# Patient Record
Sex: MALE | Race: ASIAN | NOT HISPANIC OR LATINO
[De-identification: names, ages, dates, MRNs, and addresses within clinical notes are randomized per-mention and may not be internally consistent; named-entity substitution may affect disease eponyms.]

---

## 2019-03-25 ENCOUNTER — APPOINTMENT (OUTPATIENT)
Dept: UROLOGY | Facility: CLINIC | Age: 34
End: 2019-03-25
Payer: OTHER GOVERNMENT

## 2019-03-25 VITALS
TEMPERATURE: 98.2 F | WEIGHT: 167 LBS | HEIGHT: 68 IN | OXYGEN SATURATION: 98 % | SYSTOLIC BLOOD PRESSURE: 144 MMHG | RESPIRATION RATE: 17 BRPM | HEART RATE: 87 BPM | DIASTOLIC BLOOD PRESSURE: 85 MMHG | BODY MASS INDEX: 25.31 KG/M2

## 2019-03-25 DIAGNOSIS — Z31.9 ENCOUNTER FOR PROCREATIVE MANAGEMENT, UNSPECIFIED: ICD-10-CM

## 2019-03-25 DIAGNOSIS — Z78.9 OTHER SPECIFIED HEALTH STATUS: ICD-10-CM

## 2019-03-25 DIAGNOSIS — N47.8 OTHER DISORDERS OF PREPUCE: ICD-10-CM

## 2019-03-25 PROCEDURE — 99203 OFFICE O/P NEW LOW 30 MIN: CPT

## 2019-03-25 NOTE — HISTORY OF PRESENT ILLNESS
[FreeTextEntry1] : Patient is a 32 yo M who presents for redundant prepuce, infertility.\par \par He and his wife age 30 have been trying to conceive for 5 years.  They have regular unprotected intercourse. His wife does have abnormal uterus and told she is likely difficult to have pregnancy/conception. Neither of them have children.\par \par He is also concerned about his redundant prepuce - he does not often clean under his foreskin or retract it.  He denies having any difficulties with erections or ejaculation.  No UTIs or STIs.  No LUTS.  No pain.  He is interested in circumcision because he heard that it is beneficial for his health and fertility.  He does work in [employer] and at times cannot clean himself adequately.

## 2019-03-25 NOTE — PHYSICAL EXAM
[General Appearance - Well Developed] : well developed [General Appearance - Well Nourished] : well nourished [Normal Appearance] : normal appearance [Well Groomed] : well groomed [General Appearance - In No Acute Distress] : no acute distress [Edema] : no peripheral edema [Respiration, Rhythm And Depth] : normal respiratory rhythm and effort [Exaggerated Use Of Accessory Muscles For Inspiration] : no accessory muscle use [Abdomen Soft] : soft [Abdomen Tenderness] : non-tender [Costovertebral Angle Tenderness] : no ~M costovertebral angle tenderness [Urethral Meatus] : meatus normal [Penis Abnormality] : normal uncircumcised penis [Urinary Bladder Findings] : the bladder was normal on palpation [Scrotum] : the scrotum was normal [Epididymis] : the epididymides were normal [Testes Tenderness] : no tenderness of the testes [Testes Mass (___cm)] : there were no testicular masses [Prostate Enlargement] : the prostate was not enlarged [No Prostate Nodules] : no prostate nodules [Normal Station and Gait] : the gait and station were normal for the patient's age [] : no rash [No Focal Deficits] : no focal deficits [Oriented To Time, Place, And Person] : oriented to person, place, and time [Affect] : the affect was normal [Mood] : the mood was normal [Not Anxious] : not anxious [Prostate Tenderness] : the prostate was not tender [FreeTextEntry1] : foreskin retractable

## 2019-03-25 NOTE — ASSESSMENT
[FreeTextEntry1] : Patient is a 34 yo M who presents for fertility testing and redundant prepuce.\par \par I discussed with pt the circumcision procedure and its risks/benefits/alternatives.  I d/w pt that there will be a scar circumferentially around his shaft after foreskin removed, and there are cosmetic considerations to the appearance. I also d/w pt that he may experience sexual changes/numbness after circumcision.  I also explained that circumcision is elective and has no impact on his fertility potential or overall health.  As long as he keeps his foreskin clean and retractable, there should be negligible increased risk of infection, STI or penile cancer.  After discussion, he is uncertain if he wishes to proceed with circumcision.\par \par I discussed with performing semen analysis for his fertility testing.  Order form given.\par \par F/u based on semen analysis, will contact with result.\par

## 2019-04-29 ENCOUNTER — APPOINTMENT (OUTPATIENT)
Dept: HUMAN REPRODUCTION | Facility: CLINIC | Age: 34
End: 2019-04-29
Payer: OTHER GOVERNMENT

## 2019-04-29 PROCEDURE — 89322 SEMEN ANAL STRICT CRITERIA: CPT

## 2021-10-23 ENCOUNTER — HOSPITAL ENCOUNTER (EMERGENCY)
Dept: HOSPITAL 53 - M ED | Age: 36
Discharge: HOME | End: 2021-10-23
Payer: OTHER GOVERNMENT

## 2021-10-23 VITALS — DIASTOLIC BLOOD PRESSURE: 68 MMHG | SYSTOLIC BLOOD PRESSURE: 134 MMHG

## 2021-10-23 VITALS — BODY MASS INDEX: 27.92 KG/M2 | HEIGHT: 67 IN | WEIGHT: 177.91 LBS
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
## 2021-10-23 DIAGNOSIS — S97.82XA: Primary | ICD-10-CM

## 2021-10-23 DIAGNOSIS — S90.32XA: ICD-10-CM
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
## 2021-10-23 NOTE — REP
INDICATION:

trauma, crush.



COMPARISON:

None.



TECHNIQUE:

Four views



FINDINGS:

The joint spaces are symmetric and relatively well maintained.  There is no evidence

of acute fracture or destructive osseous lesion.





IMPRESSION:

Negative.





<Electronically signed by Kareem Suazo > 10/23/21 [id number]
